# Patient Record
Sex: FEMALE | Race: WHITE | NOT HISPANIC OR LATINO | Employment: UNEMPLOYED | URBAN - METROPOLITAN AREA
[De-identification: names, ages, dates, MRNs, and addresses within clinical notes are randomized per-mention and may not be internally consistent; named-entity substitution may affect disease eponyms.]

---

## 2021-08-26 ENCOUNTER — OFFICE VISIT (OUTPATIENT)
Dept: INTERNAL MEDICINE CLINIC | Age: 51
End: 2021-08-26

## 2021-08-26 VITALS
OXYGEN SATURATION: 97 % | DIASTOLIC BLOOD PRESSURE: 74 MMHG | WEIGHT: 293 LBS | BODY MASS INDEX: 48.82 KG/M2 | SYSTOLIC BLOOD PRESSURE: 130 MMHG | HEIGHT: 65 IN | TEMPERATURE: 98.2 F | HEART RATE: 68 BPM

## 2021-08-26 DIAGNOSIS — Z12.31 BREAST CANCER SCREENING BY MAMMOGRAM: ICD-10-CM

## 2021-08-26 DIAGNOSIS — F41.9 ANXIETY: ICD-10-CM

## 2021-08-26 DIAGNOSIS — Z12.11 SCREENING FOR COLON CANCER: ICD-10-CM

## 2021-08-26 DIAGNOSIS — Z00.00 ANNUAL PHYSICAL EXAM: ICD-10-CM

## 2021-08-26 DIAGNOSIS — Z76.89 ENCOUNTER TO ESTABLISH CARE: Primary | ICD-10-CM

## 2021-08-26 DIAGNOSIS — K21.9 CHRONIC GERD: ICD-10-CM

## 2021-08-26 DIAGNOSIS — Z12.4 SCREENING FOR CERVICAL CANCER: ICD-10-CM

## 2021-08-26 DIAGNOSIS — L92.0 GRANULOMA ANNULARE: ICD-10-CM

## 2021-08-26 DIAGNOSIS — E78.49 OTHER HYPERLIPIDEMIA: ICD-10-CM

## 2021-08-26 DIAGNOSIS — I10 ESSENTIAL HYPERTENSION: ICD-10-CM

## 2021-08-26 PROCEDURE — 99203 OFFICE O/P NEW LOW 30 MIN: CPT | Performed by: INTERNAL MEDICINE

## 2021-08-26 RX ORDER — FLUTICASONE PROPIONATE 50 MCG
SPRAY, SUSPENSION (ML) NASAL DAILY PRN
COMMUNITY

## 2021-08-26 RX ORDER — FLUOXETINE HYDROCHLORIDE 20 MG/1
20 CAPSULE ORAL DAILY
Qty: 30 CAPSULE | Refills: 5 | Status: SHIPPED | OUTPATIENT
Start: 2021-08-26 | End: 2022-03-17 | Stop reason: SDUPTHER

## 2021-08-26 RX ORDER — AMLODIPINE BESYLATE 10 MG/1
10 TABLET ORAL DAILY
COMMUNITY
End: 2021-08-26 | Stop reason: SDUPTHER

## 2021-08-26 RX ORDER — FAMOTIDINE 20 MG/1
20 TABLET, FILM COATED ORAL 2 TIMES DAILY
COMMUNITY
End: 2021-08-26 | Stop reason: SDUPTHER

## 2021-08-26 RX ORDER — FLUOXETINE HYDROCHLORIDE 20 MG/1
20 CAPSULE ORAL
COMMUNITY
End: 2021-08-26 | Stop reason: SDUPTHER

## 2021-08-26 RX ORDER — ATORVASTATIN CALCIUM 10 MG/1
10 TABLET, FILM COATED ORAL DAILY
COMMUNITY
End: 2021-08-26 | Stop reason: SDUPTHER

## 2021-08-26 RX ORDER — ATORVASTATIN CALCIUM 10 MG/1
10 TABLET, FILM COATED ORAL DAILY
Qty: 30 TABLET | Refills: 5 | Status: SHIPPED | OUTPATIENT
Start: 2021-08-26 | End: 2022-03-17 | Stop reason: SDUPTHER

## 2021-08-26 RX ORDER — LISINOPRIL 20 MG/1
20 TABLET ORAL DAILY
Qty: 30 TABLET | Refills: 5 | Status: SHIPPED | OUTPATIENT
Start: 2021-08-26 | End: 2022-03-17 | Stop reason: SDUPTHER

## 2021-08-26 RX ORDER — AMLODIPINE BESYLATE 10 MG/1
10 TABLET ORAL DAILY
Qty: 30 TABLET | Refills: 5 | Status: SHIPPED | OUTPATIENT
Start: 2021-08-26

## 2021-08-26 RX ORDER — FAMOTIDINE 20 MG/1
20 TABLET, FILM COATED ORAL 2 TIMES DAILY
Qty: 60 TABLET | Refills: 5 | Status: SHIPPED | OUTPATIENT
Start: 2021-08-26

## 2021-08-26 RX ORDER — LISINOPRIL 20 MG/1
20 TABLET ORAL DAILY
COMMUNITY
End: 2021-08-26 | Stop reason: SDUPTHER

## 2021-08-26 NOTE — PROGRESS NOTES
Assessment/Plan:    Encounter to establish care  - Patient has established care with our practice and will sign for a release of her medical records from her previous PCP  -follow-up in 3 months    Essential hypertension  - fairly well controlled  - continue with Amlodipine and lisinopril, which we will refill today  - continue with a low-salt diet    Hyperlipidemia  - stable   -continue with atorvastatin, which we will refill today  -continue with heart healthy diet  -will order a lipid panel prior to next visit especially since patient has not had blood work done in years    GERD    - well controlled   - continue famotidine   - will refill famotidine today  - continue to avoid diets and behaviors that trigger symptoms    Anxiety disorder   - well controlled   - continue with fluoxetine, which we will refill as requested    Breast cancer screen  - will order a mammogram    Colon cancer screen  -will order a cologuard test    Cervical cancer screen  - will refer patient to OBGYN for a Pap smear     Skin rash, ? Granuloma annulare  -will refer patient to Dermatology    Need for annual physical exam   - will order CBC, CMP, TSH, lipid panel  - patient was encouraged to return for an annual physical exam sometime since she does not have health insurance         Diagnoses and all orders for this visit:    Encounter to establish care    Screening for colon cancer  -     Cologuard    Breast cancer screening by mammogram  -     Mammo screening bilateral w cad; Future    Screening for cervical cancer  -     Ambulatory referral to Gynecology; Future    Other hyperlipidemia  -     atorvastatin (LIPITOR) 10 mg tablet; Take 1 tablet (10 mg total) by mouth daily  -     Lipid panel; Future    Essential hypertension  -     amLODIPine (NORVASC) 10 mg tablet; Take 1 tablet (10 mg total) by mouth daily  -     lisinopril (ZESTRIL) 20 mg tablet;  Take 1 tablet (20 mg total) by mouth daily  -     Comprehensive metabolic panel; Future    Anxiety  -     FLUoxetine (PROzac) 20 mg capsule; Take 1 capsule (20 mg total) by mouth daily  -     TSH, 3rd generation with Free T4 reflex; Future    Chronic GERD  -     famotidine (PEPCID) 20 mg tablet; Take 1 tablet (20 mg total) by mouth 2 (two) times a day  -     CBC and differential; Future    Annual physical exam  -     CBC and differential; Future  -     TSH, 3rd generation with Free T4 reflex; Future  -     Comprehensive metabolic panel; Future  -     Lipid panel; Future    Granuloma annulare  -     Ambulatory referral to Dermatology; Future    BMI 50 0-59 9, adult (HCC)    Other orders  -     Discontinue: amLODIPine (NORVASC) 10 mg tablet; Take 10 mg by mouth daily  -     fluticasone (FLONASE) 50 mcg/act nasal spray; into each nostril daily as needed  -     Discontinue: FLUoxetine (PROzac) 20 mg capsule; Take 20 mg by mouth  -     Discontinue: atorvastatin (LIPITOR) 10 mg tablet; Take 10 mg by mouth daily  -     Discontinue: lisinopril (ZESTRIL) 20 mg tablet; Take 20 mg by mouth daily  -     Discontinue: famotidine (PEPCID) 20 mg tablet; Take 20 mg by mouth 2 (two) times a day          BMI Counseling: Body mass index is 53 25 kg/m²  The BMI is above normal  Nutrition recommendations include reducing intake of saturated and trans fat and reducing intake of cholesterol  Exercise recommendations include moderate physical activity 150 minutes/week and exercising 3-5 times per week  No pharmacotherapy was ordered  Patient referred to PCP due to patient being overweight  Subjective:      Patient ID: Aidee Lebron is a 46 y o  female  HPI  Patient [facility]  Last primary care physician- moved from [place] and PCP passed away      Past medical history- hyperlipidemia, htn, GERD, anxiety disorder and panic attacks, seasonal allergies, granuloma annulare    Past surgical history- left foot sx for fracture, BTL, cholecystectomy    Medications- see list    Allergies - erythromycin and bactrim    Alcohol use - occasionally, once or twice a year    Nicotine use - never    Recreational drug use - never    Work- was a     Health maintenance- last annual physical exam - 2014    Immunization- up to date with covid    Family history- htn - mom, DM - dad and brother, PGM, MI - Both sets of grandparents, dad, mom( youngest relative at age - P uncle at age 37), thyroid ca - mom, melanoma - mom    Today, patient complains of swelling of her bilateral feet that occurs from time to time especially after spending a lot of time on her feet and also especially after long car drives  She states that when she elevates her legs it improves  She also admits to night sweats especially since menopause, secondary to hot flashes as well as feelings of anxiety with occasional panic attacks  She states that her last panic attack was about 2 months ago  She denies fever, chills, chest pain, headache, dizziness, nasal congestion, rhinorrhea, cough, shortness of breath, palpitations, nausea, vomiting, abdominal pain, diarrhea, constipation, myalgias, arthralgias  She also complains of a rash on her abdomen and upper arms, near  Her axilla that she has had for years  She states that she had a biopsy done by a dermatologist and they told her it was granuloma annulare  She states that she used a steroid cream and it helped but rash came back  The following portions of the patient's history were reviewed and updated as appropriate:   She  has a past medical history of Anxiety, Depression, GERD (gastroesophageal reflux disease), and Hypertension    She   Patient Active Problem List    Diagnosis Date Noted    Encounter to establish care 08/26/2021    Chronic GERD 08/26/2021    Anxiety 08/26/2021    Essential hypertension 08/26/2021    Other hyperlipidemia 08/26/2021    Screening for cervical cancer 08/26/2021    Breast cancer screening by mammogram 08/26/2021    Screening for colon cancer 08/26/2021    BMI 50 0-59 9, adult ([facility] [address] ) 08/26/2021     She  has a past surgical history that includes Foot surgery (Left); Cholecystectomy; and Tubal ligation  Her family history includes Diabetes in her brother and son; Heart disease in her maternal grandfather, maternal grandmother, mother, paternal grandfather, paternal grandmother, and son; Hypertension in her mother; Skin cancer in her mother; Thyroid cancer in her mother  She  reports that she has never smoked  She has never used smokeless tobacco  She reports that she does not use drugs  No history on file for alcohol use  Current Outpatient Medications   Medication Sig Dispense Refill    amLODIPine (NORVASC) 10 mg tablet Take 1 tablet (10 mg total) by mouth daily 30 tablet 5    atorvastatin (LIPITOR) 10 mg tablet Take 1 tablet (10 mg total) by mouth daily 30 tablet 5    famotidine (PEPCID) 20 mg tablet Take 1 tablet (20 mg total) by mouth 2 (two) times a day 60 tablet 5    FLUoxetine (PROzac) 20 mg capsule Take 1 capsule (20 mg total) by mouth daily 30 capsule 5    fluticasone (FLONASE) 50 mcg/act nasal spray into each nostril daily as needed      lisinopril (ZESTRIL) 20 mg tablet Take 1 tablet (20 mg total) by mouth daily 30 tablet 5     No current facility-administered medications for this visit  Current Outpatient Medications on File Prior to Visit   Medication Sig    fluticasone (FLONASE) 50 mcg/act nasal spray into each nostril daily as needed    [DISCONTINUED] amLODIPine (NORVASC) 10 mg tablet Take 10 mg by mouth daily    [DISCONTINUED] atorvastatin (LIPITOR) 10 mg tablet Take 10 mg by mouth daily    [DISCONTINUED] famotidine (PEPCID) 20 mg tablet Take 20 mg by mouth 2 (two) times a day    [DISCONTINUED] FLUoxetine (PROzac) 20 mg capsule Take 20 mg by mouth    [DISCONTINUED] lisinopril (ZESTRIL) 20 mg tablet Take 20 mg by mouth daily     No current facility-administered medications on file prior to visit       She is allergic to erythromycin, other, and sulfamethoxazole-trimethoprim       Review of Systems   Constitutional: Positive for diaphoresis (hot flashes)  Negative for activity change, chills, fatigue, fever and unexpected weight change  HENT: Negative for ear pain, postnasal drip, rhinorrhea, sinus pressure and sore throat  Eyes: Negative for pain  Respiratory: Negative for cough, choking, chest tightness, shortness of breath and wheezing  Cardiovascular: Positive for leg swelling  Negative for chest pain and palpitations  Gastrointestinal: Negative for abdominal pain, constipation, diarrhea, nausea and vomiting  Genitourinary: Negative for dysuria and hematuria  Musculoskeletal: Negative for arthralgias, back pain, gait problem, joint swelling, myalgias and neck stiffness  Skin: Positive for rash (granuloma inguinale on abd and arms near the axilla)  Negative for pallor  Neurological: Negative for dizziness, tremors, seizures, syncope, light-headedness and headaches  Hematological: Negative for adenopathy  Psychiatric/Behavioral: Negative for behavioral problems  The patient is nervous/anxious ( and occasional panic attacks, last panic attack was end of june)  Objective:      /74 (BP Location: Left arm, Patient Position: Sitting, Cuff Size: Large)   Pulse 68   Temp 98 2 °F (36 8 °C) (Temporal)   Ht 5' 5" (1 651 m)   Wt (!) 145 kg (320 lb)   SpO2 97% Comment: Room Air  BMI 53 25 kg/m²          Physical Exam  Constitutional:       General: She is not in acute distress  Appearance: She is well-developed  She is obese  She is not diaphoretic  Comments:  BMI is 53 25   HENT:      Head: Normocephalic and atraumatic  Right Ear: External ear normal       Left Ear: External ear normal       Nose: Nose normal       Mouth/Throat:      Mouth: Mucous membranes are dry  Pharynx: No oropharyngeal exudate  Comments:  Dry mucous membranes  Eyes:      General: No scleral icterus  Right eye: No discharge  Left eye: No discharge  Conjunctiva/sclera: Conjunctivae normal       Pupils: Pupils are equal, round, and reactive to light  Neck:      Thyroid: No thyromegaly  Vascular: No JVD  Trachea: No tracheal deviation  Cardiovascular:      Rate and Rhythm: Normal rate and regular rhythm  Heart sounds: Normal heart sounds  No murmur heard  No friction rub  No gallop  Pulmonary:      Effort: Pulmonary effort is normal  No respiratory distress  Breath sounds: Normal breath sounds  No wheezing or rales  Chest:      Chest wall: No tenderness  Abdominal:      General: Bowel sounds are normal  There is no distension  Palpations: Abdomen is soft  There is no mass  Tenderness: There is no abdominal tenderness  There is no guarding or rebound  Musculoskeletal:         General: No tenderness or deformity  Normal range of motion  Cervical back: Normal range of motion and neck supple  Right lower leg: Edema ( trace pitting pedal edema bilaterally) present  Left lower leg: Edema present  Lymphadenopathy:      Cervical: No cervical adenopathy  Skin:     General: Skin is warm and dry  Coloration: Skin is not pale  Findings: Rash ( macula rash with a central clearing but without scaling concerning  for possible granuloma annulare) present  No erythema  Neurological:      Mental Status: She is alert and oriented to person, place, and time  Cranial Nerves: No cranial nerve deficit  Motor: No abnormal muscle tone  Coordination: Coordination normal       Deep Tendon Reflexes: Reflexes are normal and symmetric  Psychiatric:         Behavior: Behavior normal            No results found for any previous visit

## 2022-01-17 ENCOUNTER — OFFICE VISIT (OUTPATIENT)
Dept: INTERNAL MEDICINE CLINIC | Age: 52
End: 2022-01-17

## 2022-01-17 VITALS
HEART RATE: 68 BPM | OXYGEN SATURATION: 96 % | HEIGHT: 65 IN | SYSTOLIC BLOOD PRESSURE: 128 MMHG | DIASTOLIC BLOOD PRESSURE: 70 MMHG | WEIGHT: 293 LBS | BODY MASS INDEX: 48.82 KG/M2 | TEMPERATURE: 98.5 F

## 2022-01-17 DIAGNOSIS — Z00.00 ANNUAL PHYSICAL EXAM: Primary | ICD-10-CM

## 2022-01-17 PROCEDURE — 99396 PREV VISIT EST AGE 40-64: CPT | Performed by: INTERNAL MEDICINE

## 2022-01-17 NOTE — PATIENT INSTRUCTIONS

## 2022-01-17 NOTE — PROGRESS NOTES
Assessment/Plan:    Annual physical examination  - History and physical examination done  - Pt was counseled to eat a heart healthy diet, to drink at least 2 L of water daily, to take a daily multivitamin and to exercise for at least 30 minutes of cardio exercise daily, for at least 5 days a week  - CBC, CMP, TSH, hemoglobin A1c and lipid panel have been ordered and we will follow up with the results  CBC, CMP, TSH and lipid panel were previously ordered and the labs were reprinted today  -  She is up-to-date with 2 COVID vaccines but not the flu shot and is not sure of her last tetanus shot  -   Patient is not up-to-date with her mammogram, Pap smear or colonoscopy  She was encouraged to get her screening tesst done  - follow up in  Six months   - patient needs her physical from to be completed for employment but needs her immunizations to recorded and cannot remember when she had her shots  She was encouraged to get her records and bring back and will complete the form for her  She needs her MMR, Tdap and hepatitis-B shots recorded  Diagnoses and all orders for this visit:    Annual physical exam    BMI 50 0-59 9, adult ([facility] [address] )  -     Hemoglobin A1C; Future          BMI Counseling: Body mass index is 50 9 kg/m²  The BMI is above normal  Nutrition recommendations include limiting drinks that contain sugar, reducing intake of saturated and trans fat and reducing intake of cholesterol  Exercise recommendations include moderate physical activity 150 minutes/week  No pharmacotherapy was ordered  Patient referred to PCP  Rationale for BMI follow-up plan is due to patient being overweight or obese  Subjective:      Patient ID: Aydee Michel is a 46 y o  female      HPI  Patient presents for an annual physical exam     Last annual physical exam- 2016    Past medical history- hyperlipidemia, htn, GERD, anxiety disorder and panic attacks, seasonal allergies, granuloma annulare    Past surgical history-left foot sx for fracture, BTL, cholecystectomy    Medications- see list    Allergies- erythromycin and bactrim    Diet- a mixture of balanced diet and junk, drinks about 60-64 oz of water daily    Exercise-  Starting to exercise    Alcohol use- very rarely, with a max of 1/2 a drink    Caffeine and soda use- iced tea once a week, no coffee or soda    Nicotine use- never    Recreational drug use- never    Work- none right and trying to work as a     Sexual history, STD history and HIV testing-not sexually active, std hx - never, hiv testing - done and negative    Gynecological history/Prostate health/testicular health history- LMP - at age 43, last pap smear - 2015, last mammogram - 11 years ago, scheduled for tomorrow    Colonoscopy-  Last colonoscopy - never, wants to get the cologuard - denies chronic constipation,  Rectal bleeding, melena stools or change in stool caliber or family hx of colon ca    Immunization history- got the 2 covid shot done but not the flu shot    Dental visit- has not seen since 2017    Vision- reading glasses, presbyopia    Family history-   htn - mom,  DM - dad and brother, PGM  MI - dad  MI - Both sets of grandparents, dad, mom( youngest relative at age - P uncle at age 37),   MI - paternal great grandfather  thyroid ca - mom,   melanoma - mom       Today, patient needs her pre-employment physical form completed  She admits to diaphoresis on feelings of anxiety and occasional panic attacks but denies fever, chills, headache, dizziness, nasal congestion, rhinorrhea, postnasal drip, cough, wheezing, shortness of breath, palpitations, chest pain, nausea, vomiting, abdominal pain, diarrhea, constipation, myalgias, arthralgias, hematochezia, hematuria, arthralgias, myalgias, feelings of depression      The following portions of the patient's history were reviewed and updated as appropriate:   She  has a past medical history of Anxiety, Depression, GERD (gastroesophageal reflux disease), and Hypertension  She   Patient Active Problem List    Diagnosis Date Noted    Encounter to establish care 08/26/2021    Chronic GERD 08/26/2021    Anxiety 08/26/2021    Essential hypertension 08/26/2021    Other hyperlipidemia 08/26/2021    Screening for cervical cancer 08/26/2021    Breast cancer screening by mammogram 08/26/2021    Annual physical exam 08/26/2021    BMI 50 0-59 9, adult ([facility] [address] ) 08/26/2021     She  has a past surgical history that includes Foot surgery (Left); Cholecystectomy; and Tubal ligation  Her family history includes Diabetes in her brother and son; Heart disease in her maternal grandfather, maternal grandmother, mother, paternal grandfather, paternal grandmother, and son; Hypertension in her mother; Skin cancer in her mother; Thyroid cancer in her mother  She  reports that she has never smoked  She has never used smokeless tobacco  She reports that she does not use drugs  No history on file for alcohol use  Current Outpatient Medications   Medication Sig Dispense Refill    amLODIPine (NORVASC) 10 mg tablet Take 1 tablet (10 mg total) by mouth daily 30 tablet 5    atorvastatin (LIPITOR) 10 mg tablet Take 1 tablet (10 mg total) by mouth daily 30 tablet 5    famotidine (PEPCID) 20 mg tablet Take 1 tablet (20 mg total) by mouth 2 (two) times a day 60 tablet 5    FLUoxetine (PROzac) 20 mg capsule Take 1 capsule (20 mg total) by mouth daily 30 capsule 5    fluticasone (FLONASE) 50 mcg/act nasal spray into each nostril daily as needed      lisinopril (ZESTRIL) 20 mg tablet Take 1 tablet (20 mg total) by mouth daily 30 tablet 5     No current facility-administered medications for this visit       Current Outpatient Medications on File Prior to Visit   Medication Sig    amLODIPine (NORVASC) 10 mg tablet Take 1 tablet (10 mg total) by mouth daily    atorvastatin (LIPITOR) 10 mg tablet Take 1 tablet (10 mg total) by mouth daily    famotidine (PEPCID) 20 mg tablet Take 1 tablet (20 mg total) by mouth 2 (two) times a day    FLUoxetine (PROzac) 20 mg capsule Take 1 capsule (20 mg total) by mouth daily    fluticasone (FLONASE) 50 mcg/act nasal spray into each nostril daily as needed    lisinopril (ZESTRIL) 20 mg tablet Take 1 tablet (20 mg total) by mouth daily     No current facility-administered medications on file prior to visit  She is allergic to erythromycin and sulfamethoxazole-trimethoprim       Review of Systems   Constitutional: Positive for diaphoresis  Negative for activity change, chills, fatigue, fever and unexpected weight change  HENT: Negative for ear pain, postnasal drip, rhinorrhea, sinus pressure and sore throat  Eyes: Negative for pain  Respiratory: Negative for cough, choking, chest tightness, shortness of breath and wheezing  Cardiovascular: Negative for chest pain, palpitations and leg swelling  Gastrointestinal: Negative for abdominal pain, constipation, diarrhea, nausea and vomiting  Genitourinary: Negative for dysuria and hematuria  Musculoskeletal: Negative for arthralgias, back pain, gait problem, joint swelling, myalgias and neck stiffness  Skin: Negative for pallor and rash  Neurological: Negative for dizziness, tremors, seizures, syncope, light-headedness and headaches  Hematological: Negative for adenopathy  Psychiatric/Behavioral: Negative for behavioral problems  The patient is nervous/anxious (with occasional panic attacks usually when sleeping)  Objective:      /70 (BP Location: Left arm, Patient Position: Sitting, Cuff Size: Large)   Pulse 68   Temp 98 5 °F (36 9 °C) (Temporal)   Ht 5' 5" (1 651 m)   Wt (!) 139 kg (305 lb 14 4 oz)   SpO2 96%   BMI 50 90 kg/m²          Physical Exam  Constitutional:       General: She is not in acute distress  Appearance: She is well-developed  She is obese  She is not diaphoretic  Comments:   BMI 50 90     HENT:      Head: Normocephalic and atraumatic  Right Ear: External ear normal  Tympanic membrane is erythematous  Left Ear: External ear normal  Tympanic membrane is erythematous  Nose: Nose normal       Mouth/Throat:      Mouth: Mucous membranes are dry  Pharynx: Posterior oropharyngeal erythema (dry mucous membranes with mild oropharyngeal erythema) present  No oropharyngeal exudate  Eyes:      General: No scleral icterus  Right eye: No discharge  Left eye: No discharge  Conjunctiva/sclera: Conjunctivae normal       Pupils: Pupils are equal, round, and reactive to light  Neck:      Thyroid: No thyromegaly  Vascular: No JVD  Trachea: No tracheal deviation  Cardiovascular:      Rate and Rhythm: Normal rate and regular rhythm  Heart sounds: Normal heart sounds  No murmur heard  No friction rub  No gallop  Pulmonary:      Effort: Pulmonary effort is normal  No respiratory distress  Breath sounds: Normal breath sounds  No wheezing or rales  Chest:      Chest wall: No tenderness  Abdominal:      General: Bowel sounds are normal  There is no distension  Palpations: Abdomen is soft  There is no mass  Tenderness: There is no abdominal tenderness  There is no guarding or rebound  Musculoskeletal:         General: No tenderness or deformity  Normal range of motion  Cervical back: Normal range of motion and neck supple  Lymphadenopathy:      Cervical: No cervical adenopathy  Skin:     General: Skin is warm and dry  Coloration: Skin is not pale  Findings: Rash ( erythematous macular rash with a history of granuloma annulare  rash) present  No erythema  Neurological:      Mental Status: She is alert and oriented to person, place, and time  Cranial Nerves: No cranial nerve deficit  Motor: No abnormal muscle tone  Coordination: Coordination normal       Deep Tendon Reflexes: Reflexes are normal and symmetric        Comments: Cranial nerves 2-12 are intact bilaterally  Muscle strength is 5/5 in all extremities  Sensation is intact in bilateral face and extremities  Rapid alternating movement and finger-to-nose pointing test intact   Deep tendon reflexes are 2+ bilaterally  Gait is intact       Psychiatric:         Behavior: Behavior normal            No results found for any previous visit

## 2022-01-18 ENCOUNTER — TELEPHONE (OUTPATIENT)
Dept: INTERNAL MEDICINE CLINIC | Age: 52
End: 2022-01-18

## 2022-01-18 NOTE — TELEPHONE ENCOUNTER
Patient is calling to let you know that she has been trying to contact every person to get the vaccine records  She reach her old PCP down in [place] and found out that she got a Tetanus shot in 07/2015, and the COVID vaccines     She was told by a lady in [place] at the old office stated that we can go online to [employer]  But it all depends on the state if it is different or not to locate patient's immunizations that they have had else where

## 2022-03-17 DIAGNOSIS — I10 ESSENTIAL HYPERTENSION: ICD-10-CM

## 2022-03-17 DIAGNOSIS — E78.49 OTHER HYPERLIPIDEMIA: ICD-10-CM

## 2022-03-17 DIAGNOSIS — F41.9 ANXIETY: ICD-10-CM

## 2022-03-17 RX ORDER — FLUOXETINE HYDROCHLORIDE 20 MG/1
CAPSULE ORAL
Qty: 30 CAPSULE | Refills: 0 | OUTPATIENT
Start: 2022-03-17

## 2022-03-17 RX ORDER — ATORVASTATIN CALCIUM 10 MG/1
TABLET, FILM COATED ORAL
Qty: 30 TABLET | Refills: 0 | OUTPATIENT
Start: 2022-03-17

## 2022-03-17 RX ORDER — FLUOXETINE HYDROCHLORIDE 20 MG/1
20 CAPSULE ORAL DAILY
Qty: 30 CAPSULE | Refills: 5 | Status: SHIPPED | OUTPATIENT
Start: 2022-03-17

## 2022-03-17 RX ORDER — ATORVASTATIN CALCIUM 10 MG/1
10 TABLET, FILM COATED ORAL DAILY
Qty: 30 TABLET | Refills: 5 | Status: SHIPPED | OUTPATIENT
Start: 2022-03-17

## 2022-03-17 RX ORDER — LISINOPRIL 20 MG/1
20 TABLET ORAL DAILY
Qty: 30 TABLET | Refills: 5 | Status: SHIPPED | OUTPATIENT
Start: 2022-03-17

## 2022-03-17 RX ORDER — LISINOPRIL 20 MG/1
TABLET ORAL
Qty: 30 TABLET | Refills: 0 | OUTPATIENT
Start: 2022-03-17

## 2022-03-22 ENCOUNTER — TELEPHONE (OUTPATIENT)
Dept: INTERNAL MEDICINE CLINIC | Age: 52
End: 2022-03-22

## 2022-03-22 NOTE — TELEPHONE ENCOUNTER
LMOM for pt  To call back to discuss scheduling mammogram   Please help pt  Schedule mammogram in [place] office      Thank you

## 2022-05-25 LAB — HBA1C MFR BLD HPLC: 5.4 %

## 2022-06-21 ENCOUNTER — TELEPHONE (OUTPATIENT)
Dept: INTERNAL MEDICINE CLINIC | Facility: OTHER | Age: 52
End: 2022-06-21

## 2022-06-21 NOTE — TELEPHONE ENCOUNTER
LMOM for pt to call me at [facility] office    Pt is due for a mammogram please assist in scheduling

## 2022-10-12 PROBLEM — Z12.4 SCREENING FOR CERVICAL CANCER: Status: RESOLVED | Noted: 2021-08-26 | Resolved: 2022-10-12

## 2023-08-30 ENCOUNTER — TELEPHONE (OUTPATIENT)
Dept: INTERNAL MEDICINE CLINIC | Age: 53
End: 2023-08-30

## 2023-08-30 NOTE — TELEPHONE ENCOUNTER
Called patient to schedule over due follow up appointment with Dr. Arnulfo Fleming. Patient now lives in [facility] and follows with Dr. Adolfo Winston in [facility].      Please remove Dr. Arnulfo Fleming as PCP    Thank you

## 2023-09-01 NOTE — TELEPHONE ENCOUNTER
08/31/23 10:32 PM        The office's request has been received, reviewed, and the patient chart updated. The PCP has successfully been removed with a patient attribution note. This message will now be completed.         Thank you  Vinod Akhtar